# Patient Record
Sex: FEMALE | Race: WHITE | NOT HISPANIC OR LATINO | URBAN - METROPOLITAN AREA
[De-identification: names, ages, dates, MRNs, and addresses within clinical notes are randomized per-mention and may not be internally consistent; named-entity substitution may affect disease eponyms.]

---

## 2020-11-24 ENCOUNTER — VIRTUAL VISIT (OUTPATIENT)
Dept: FAMILY MEDICINE | Facility: CLINIC | Age: 25
End: 2020-11-24
Payer: COMMERCIAL

## 2020-11-24 DIAGNOSIS — F64.9 GENDER DYSPHORIA: Primary | ICD-10-CM

## 2020-11-24 PROBLEM — D50.0 IRON DEFICIENCY ANEMIA DUE TO CHRONIC BLOOD LOSS: Status: ACTIVE | Noted: 2019-08-21

## 2020-11-24 PROBLEM — K58.0 IRRITABLE BOWEL SYNDROME WITH DIARRHEA: Status: ACTIVE | Noted: 2019-08-21

## 2020-11-24 PROBLEM — L40.9 PSORIASIS: Status: ACTIVE | Noted: 2019-08-21

## 2020-11-24 PROCEDURE — 99203 OFFICE O/P NEW LOW 30 MIN: CPT | Mod: 95 | Performed by: FAMILY MEDICINE

## 2020-11-24 RX ORDER — LANSOPRAZOLE 30 MG/1
CAPSULE, DELAYED RELEASE ORAL
COMMUNITY
Start: 2020-08-24

## 2020-11-24 RX ORDER — TRIAMCINOLONE ACETONIDE 1 MG/G
CREAM TOPICAL
COMMUNITY

## 2020-11-24 RX ORDER — DROSPIRENONE AND ETHINYL ESTRADIOL 0.02-3(28)
KIT ORAL
COMMUNITY
Start: 2020-09-28

## 2020-11-24 RX ORDER — TACROLIMUS 1 MG/G
OINTMENT TOPICAL
COMMUNITY

## 2020-11-24 RX ORDER — CLOBETASOL PROPIONATE 0.5 MG/ML
SOLUTION TOPICAL
COMMUNITY

## 2020-11-24 SDOH — HEALTH STABILITY: MENTAL HEALTH: HOW OFTEN DO YOU HAVE A DRINK CONTAINING ALCOHOL?: NEVER

## 2020-11-24 ASSESSMENT — ANXIETY QUESTIONNAIRES
6. BECOMING EASILY ANNOYED OR IRRITABLE: NOT AT ALL
1. FEELING NERVOUS, ANXIOUS, OR ON EDGE: NOT AT ALL
GAD7 TOTAL SCORE: 0
7. FEELING AFRAID AS IF SOMETHING AWFUL MIGHT HAPPEN: NOT AT ALL
3. WORRYING TOO MUCH ABOUT DIFFERENT THINGS: NOT AT ALL
5. BEING SO RESTLESS THAT IT IS HARD TO SIT STILL: NOT AT ALL
IF YOU CHECKED OFF ANY PROBLEMS ON THIS QUESTIONNAIRE, HOW DIFFICULT HAVE THESE PROBLEMS MADE IT FOR YOU TO DO YOUR WORK, TAKE CARE OF THINGS AT HOME, OR GET ALONG WITH OTHER PEOPLE: NOT DIFFICULT AT ALL
2. NOT BEING ABLE TO STOP OR CONTROL WORRYING: NOT AT ALL

## 2020-11-24 ASSESSMENT — PATIENT HEALTH QUESTIONNAIRE - PHQ9
5. POOR APPETITE OR OVEREATING: NOT AT ALL
SUM OF ALL RESPONSES TO PHQ QUESTIONS 1-9: 0

## 2020-11-24 NOTE — PROGRESS NOTES
"Family Medicine Video Visit Note  Nathan is being evaluated via a billable video visit.           Video Visit Consent     Patient was verbally read the following and verbal consent was obtained.  \"This video visit will be conducted via a call between you and your physician/provider. We have found that certain health care needs can be provided without the need for an in-person physical exam.  This service lets us provide the care you need with a video conversation.  If a prescription is necessary we can send it directly to your pharmacy.  If lab work is needed we can place an order for that and you can then stop by our lab to have the test done at a later time.    If during the course of the call the physician/provider feels a video visit is not appropriate, you will not be charged for this service.\"     (Name person giving consent:  Patient   Date verbal consent given:  11/24/2020  Time verbal consent given:  9:26 AM)    Patient would like the video invitation sent by: Send to e-mail at: [email or web address]     Chief Complaint   Patient presents with     New Patient     GSC     Current Outpatient Medications   Medication Sig Dispense Refill     cholecalciferol 50 MCG (2000 UT) CAPS Take 4000 IU per day       clobetasol (TEMOVATE) 0.05 % external solution        drospirenone-ethinyl estradiol (MANGO) 3-0.02 MG tablet TAKE 1 TABLET BY MOUTH EVERY DAY FOR 3 WEEKS, TOSS 4TH WEEKS TABLETS, AND START A NEW PACK. TAKE 1 WEEK OFF EVERY 4 PACKS       LANsoprazole (PREVACID) 30 MG DR capsule TAKE 1 CAPSULE BY MOUTH DAILY       tacrolimus (PROTOPIC) 0.1 % external ointment        triamcinolone (KENALOG) 0.1 % external cream        Allergies   Allergen Reactions     Azithromycin      Lac Bovis Nausea and Vomiting and Nausea          HPI     Video Start Time: 9:55 AM    Gender Support Clinic Visit Note         HPI      Nathan is a 24 year old individual that uses any pronouns and is consulting for gender care.    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     Right now have a primary care provider. Looking more for provider for gender clinica and obgyn care.     Thinking about top surgery. Kind of scares them. They don't want to do anything that could affect their health. Was maybe thinking hormones but as a kid did take growth hormone so doesn't know if that would be a problem. 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 Doesn't mind voice. Pretty short. Would want to wait on hormones until knows for sure want to do them.     Gender identity  Gender Identity: Nonbinary-transmasculine.     Sex Assigned at Birth  female     Gender journey: Went to [employer] and took class freshman year about gender identity and sexual orientation. What each one means. Started it then. Came from family where didn't have that exposure. Didn't start until a couple years after college. Sergio year masculine clothing. Mainly wore sports bra. Senior year in college wanted to change but wasn't there yet. Feb this year wanted to change name and came out as nonbinary. Started this Feb. For them, doesn't have bottom dysphoria at all. Wears baggy clothing. Likes to be very neutral. Prefer to be more masculine. Didn't bind at that point. Binding scares them. Doesn't want to do anything harmful to self. Wants to be as safe as possible. Told immediate family in July. Feb to July mainly friends. Takes birth control and doesn't get period often (every 3 months). Tries to stick with masculine clothing. Bought safe binder from [facility]. Hesitant on stuff.     Support network for gender identity: Friends, parents are super supportive, family tries but hard because over zoom    Anatomic gender dysphoria:   - chest dysphoria, mainly chest right now  - no voice dysphoria  - doesn't want facial fair or would need facial hair  - no dysphoria around menses  - no bottom dysphoria, at least for now      Previous medical care  related to gender affirmation:   Surgical interventions desired: top surgery      Mental Health Assessment:  PHQ-9 SCORE 11/24/2020   PHQ-9 Total Score 0      KATHY-7 SCORE 11/24/2020   Total Score 0      Middle school got really bad anxiety and was taking medication for it. Got depression from it and so stopped it. Middle school severely bullied. Did relaxation techniques. More on the side of things affect them more. Still get some anxiety. Moments of severe anxiety. Relaxation techniques.    Hx of self harm:  No   Hx of suicidal thoughts: No   Hx of suicide attempts/ hospitalizations for mental health: No      Family history  -Mom with stroke at age 2004. No deficits. HLD. HTN  -Sister HLD  -Mat gpa: vascular dementia  -Mom's side cousins with breast cancer  -Mat gma glioblastoma  -Pat gpa dementia, age 90 stent in heart      Past History   Was on growth hormone as a kid. Having growing issues in elementary school. Much lower than graphs in weight and height. In middle school good candidate for growth hormone. Was age 11-12 when started it. Did that for 2-3 years. Then stopped because reached more normal height and no longer concerned with growth plates.    Always have had GI issues as a kid. Started taking prevacid for GERD in High School. More GI issues in college. Did GI workup (colonoscopy and MRI) and didn't find anything and so chalked it up to IBS.     Had psoriasis of scalp and groin area.     Past Medical History:   Diagnosis Date     Anemia      Gastroesophageal reflux disease      Growth hormone deficiency (H)     Required growth hormone at age 11-12.      Psoriasis      Current Outpatient Medications   Medication Sig Dispense Refill     cholecalciferol 50 MCG (2000 UT) CAPS Take 4000 IU per day       clobetasol (TEMOVATE) 0.05 % external solution        drospirenone-ethinyl estradiol (MANGO) 3-0.02 MG tablet TAKE 1 TABLET BY MOUTH EVERY DAY FOR 3 WEEKS, TOSS 4TH WEEKS TABLETS, AND START A NEW PACK. TAKE 1  WEEK OFF EVERY 4 PACKS       LANsoprazole (PREVACID) 30 MG DR capsule TAKE 1 CAPSULE BY MOUTH DAILY       tacrolimus (PROTOPIC) 0.1 % external ointment        triamcinolone (KENALOG) 0.1 % external cream        Family History   Problem Relation Age of Onset     Cerebrovascular Disease Mother      Hypertension Mother      Hyperlipidemia Mother      Allergies   Allergen Reactions     Azithromycin      Lac Bovis Nausea and Vomiting and Nausea     Social History     Socioeconomic History     Marital status: Single     Spouse name: None     Number of children: None     Years of education: None     Highest education level: None   Occupational History     None   Social Needs     Financial resource strain: None     Food insecurity     Worry: None     Inability: None     Transportation needs     Medical: None     Non-medical: None   Tobacco Use     Smoking status: Never Smoker     Smokeless tobacco: Never Used   Substance and Sexual Activity     Alcohol use: Not Currently     Frequency: Never     Drug use: Never     Sexual activity: None          Physical Exam:   There were no vitals filed for this visit.  BMI= There is no height or weight on file to calculate BMI.   Wt Readings from Last 10 Encounters:   No data found for Wt     GENERAL: healthy, alert and no distress  HEENT: sclera anicteric  RESP: normal respiratory effort  Psych: affect appropriate/mood-congruent, normal speech, insight good, judgement good  Neuro: no tremor  Skin: no jaundice; no rash on head or neck    Assessment and Plan     Nathan (uses any pronouns) was seen today for new patient to discuss gender care.    Gender dysphoria  List of gender therapists given to patient (via AVS) to help with gender identity exploration and goals going forward. Schedule follow up after several visits with therapist and we will continue discussing more medical specifics. Discussed safety with binder use (no more than 8 hours per day, if getting pain, take time off from  "gonzalo). Next visit discuss top surgery and what that would look like. Can also discuss hormone therapy and what that might look like and risks/benefits so patient can have the information to make a more informed decision about their gender care.     Follow up: after several visits with therapist or when ready      After Visit Information:  Patient chose to view AVS via [employer]    Video-Visit Details    Type of service:  Video Visit    Video End Time (time video stopped): 10:29am    Originating Location (pt. Location): Home    Distant Location (provider location):  [facility]     Mode of Communication:  Video Conference via Cytheris    I spent 34 min via video with the patient and >50% was spent counselling the patient about the above medical conditions, educating patient on their medical conditions, behavioral interventions and supports.    Conor \"Cinthya\"DO Oscar  Pager: [phone]      "

## 2020-11-24 NOTE — PATIENT INSTRUCTIONS
After visit summary    Schedule with gender therapist.     Schedule follow up at your convenience to discuss more medical specifics.     Cinthya Gabriel         Individual Mental Health Practitioners   Therapist Children Adolescents Adults Family Other   Kenney Fong PhD  [address] [place], [place]  [phone]  13+ YES     Mak [place], [facility], DCSW  [address]  [facility] [address]  Work Phone: [phone]   YES YES    Linn Gomes PsyD, LP  [address]  [place]  [address]  PH: [phone]   YES YES    Tiara Stern, [facility]  [address]  [place] [address]  [phone]  [facility]   YES YES On Bus line   Emily Garcia PsyD, [facility]  [facility]  [address]  [place], [place]   PH:[phone] YES YES YES YES Business consulting   Jose Boyer, PhD, LP  [address]  [place] [address]  PH: [phone]  [employer]  Older Adolescents YES  On busline   Phillip Heredia, [facility]  [address]  [place] [address]  PH: [phone]  [employer]  YES YES YES Busline   Lori Paez M.Ed, [facility], [facility]  [address]  [place], [place]   [phone]  [email or web address]   YES YES  Experienced in Trans Veterans    Allison Ayala MA, LMFT, CST  [address]  [place], [place] [address] [phone]  [facility].Askuity  YES YES YES Sex positive therapy    KAROLYN Jean MFA, PsyD, LP   [address]  [address]  [place] [address] [phone]  [email or web address]   YES YES Sexuality groups  [facility]        Mental Health [facility]   Patients: Children, Teen, Adult, Families, Couples  [address]  [place] [address] [phone] fax  [facility].Askuity    [facility]  Patients: Children, adolescents, adults  Locations:  [address]  [place] [address]  [place] [address]  [address]  [place] [address]  [place] [address]    Appointment Scheduling   [phone]  [email or web address] [email or web address]  [facility]  All ages- Also offers in-home therapy and sliding fee  [address]  [place] [address]   Phone: [phone]  boolinoUnityPoint Health-Trinity Regional Medical CenterPattern Genomics    [facility]-[address] [place] [address]  Intake line [phone]  [email or web address]://www.theRutland [facility] State HospitallypartMer[facility].org/programsservices/counseling/transgender-mental-health/    Transgender Mental Health Team  Therapists Children Adolescents Adults Family Other information   Donita Zavaleta MA, LMFT YES YES YES  [facility] Location   Chelsi Clemens MSW, LICSW   YES YES EMDR  [facility] Location     Umang Gutierrez MA, LP YES YES YES YES [facility] Location- [facility] Sexual medicine-Psychology  [email or web address]  [place] Location   [address] [place] [address]  Phone [phone]  Therapists Children Adolescents Adults Family Other   Kay Delacruz PsyD, LP   YES YES-couples    Robin [place] PsyD, LP   YES     Tia Rios, PhD, LP   YES       [place] Location  [address] [place] [place] [address]  Phone: [phone]  Therapists Children Adolescents Adults Family Other   Kim Page PsyD, LP   YES YES    Crystal Saenz PsyD, LP   YES       [facility]  Patients-Queer and Trans Youth and Family counseling serving ages 12-26  [address] [place] [address]  Phone: [phone]  http://Reclaim.[facility]  [employer]  [address]  [place] [address]  Main line: [phone]  [email or web address]    [facility] Center for Sexual Health/ Program in Human Sexuality  [address]  [place] [address]  PH: [phone]    [email or web address]://www.sexualhealth.[facility].edu/clinic-center-sexual-health/transgender-health-services    Therapists Children Adolescents Adults Family Other information   Dulce Avery PsyD YES YES YES     Mariana Perez, PhD YES YES YES     Valerie Caldwell PsyD  YES YES     Alicia Sampson, PhD YES YES YES     Medardo Garcia, PhD, MPH YES YES   Sexual and Gender minority health   Multiple other Post-Doctoral fellows practicing at this location as well           [facility]  [place] Location: [address] [place] [address]  [place] Location: [address] [address]  Toll Free: [phone]  Phone: [phone]   http://www.[facility]/therapeutic-services    Daytime LGBTQ+ DBT skills group  Groups are being added at [facility], in addition to the current evening LGBTQ+ skills group. Both skills groups are held on Mondays.  Daytime group will be 1:00pm-3:30pm and evening group (currently full) is 5:00pm-7:30pm.  [facility] offers a certified DBT program. Clients commit to one year of participation in skills group and individual DBT therapy. DBT individual therapy can be done by current therapist if the therapist is intensively trained and part of consultation group of intensively trained therapists, and if the therapist offers 24/7 coaching calls. Otherwise, the client can work with one of our DBT therapists and then return to their previous individual therapist at the conclusion of the 1-year program.  MWP Member Ruthie Beltre MA, [facility]   [phone]  [email or web address].com  [address] [facility] [address], [place]     Chemical Health Resources    [facility]  [address] [place] [address]  Phone: [phone] or toll free, [phone]  [email or web address]    [facility] LGBT Residential CD Treatment Program  [address] [place]  [address]  [phone]  http://www.Aibo.PriceTag/programs/residential/ckxacaeiq-dujp-vnxoiytytfi-[facility]-mn/    Additional Provider Directories  [facility]- Provider Directory for all services  http://www.[facility].org/resources-for-you/provider-directory/    [facility] lesbian mackey bisexual transgender & allied mental health providers' network  http://www.lgbttherapists.org/    Provider Directory for Health Related resources in [place]  http://mnlgbtqdirectory.org    Directory for kink, Polyamory, gender non-conforming aware Providers  http://www.kinkaware.org  \

## 2020-11-25 ASSESSMENT — ANXIETY QUESTIONNAIRES: GAD7 TOTAL SCORE: 0

## 2021-01-15 ENCOUNTER — HEALTH MAINTENANCE LETTER (OUTPATIENT)
Age: 26
End: 2021-01-15

## 2021-05-25 ENCOUNTER — OFFICE VISIT (OUTPATIENT)
Dept: FAMILY MEDICINE | Facility: CLINIC | Age: 26
End: 2021-05-25
Payer: COMMERCIAL

## 2021-05-25 VITALS
TEMPERATURE: 98.2 F | RESPIRATION RATE: 16 BRPM | SYSTOLIC BLOOD PRESSURE: 118 MMHG | HEART RATE: 84 BPM | DIASTOLIC BLOOD PRESSURE: 76 MMHG | WEIGHT: 137 LBS | OXYGEN SATURATION: 96 %

## 2021-05-25 DIAGNOSIS — F64.9 GENDER DYSPHORIA: Primary | ICD-10-CM

## 2021-05-25 DIAGNOSIS — J30.2 SEASONAL ALLERGIC RHINITIS, UNSPECIFIED TRIGGER: ICD-10-CM

## 2021-05-25 DIAGNOSIS — Z83.438 FAMILY HISTORY OF HYPERLIPIDEMIA: ICD-10-CM

## 2021-05-25 PROCEDURE — 99213 OFFICE O/P EST LOW 20 MIN: CPT | Mod: GC | Performed by: STUDENT IN AN ORGANIZED HEALTH CARE EDUCATION/TRAINING PROGRAM

## 2021-05-25 RX ORDER — CETIRIZINE HYDROCHLORIDE 10 MG/1
10 TABLET ORAL DAILY
COMMUNITY
Start: 2021-05-25

## 2021-05-25 NOTE — PATIENT INSTRUCTIONS
Wonderful to meet you today, Nathan.    Today we discussed:    1. Gender care  - Can continue binding, if you are binding more frequently/more days in a row, try to limit the number of hours you are binding to less than 8. Otherwise, you are currently binding an amount that is pretty conservative and shouldn't cause problems.   - I will check in with a [employer] message in about 3 months to see how you are doing and if you have any updates/needs from a gender care perspective.  - COMPREHENSIVE GENDER CARE REFERRAL - INTERNAL    2. Seasonal allergic rhinitis, unspecified trigger  - cetirizine (ZYRTEC) 10 MG tablet; Take 1 tablet (10 mg) by mouth daily  Dispense:      3. Family history of hyperlipidemia  If you eventually decide to pursue testosterone therapy we will closely monitor your cholesterol since you have a strong family history of high cholesterol.       Please call or return to clinic if your symptoms don't go away.    Follow up plan  Return in about 6 months (around 11/25/2021) for gender care follow-up.    Thank you for coming to [facility] today.  COVID-19 Vaccine:  If you are eligible for the COVID-19 vaccine, you can schedule via [employer] or call [place] Scheduling at [phone]. If you need assistance with scheduling, please speak to a Care Coordinator or your provider.   Lab Testing:  **If you had lab testing today and your results are reassuring or normal they will be mailed to you or sent through [employer] within 7 days.   **If the lab tests need quick action we will call you with the results.  **If you are having labs done on a different day, please call [phone] to schedule at [facility] or [phone] for other [place] [facility] locations.   The phone number we will call with results is # [phone] (home) . If this is not the best number please call our clinic and change the number.  Medication Refills:  If you need any refills please call your pharmacy and they will contact  us.   If you need to  your refill at a new pharmacy, please contact the new pharmacy directly. The new pharmacy will help you get your medications transferred faster.   Scheduling:  If you have any concerns about today's visit or wish to schedule another appointment please call our office during normal business hours [phone] (8-5:00 M-F)  If a referral was made to a [facility] and you don't get a call from central scheduling please call [phone].  If a Mammogram was ordered for you at [facility] call [phone] to schedule or change your appointment.  If you had an EKG/XRay/CT/Ultrasound/MRI ordered the number is [phone] to schedule or change your radiology appointment.   Medical Concerns:  If you have urgent medical concerns please call [phone] at any time of the day.    Lupe Bray MD

## 2021-05-25 NOTE — PROGRESS NOTES
Assessment & Plan     Gender dysphoria  Family history of hyperlipidemia  Patient new to me, second visit in clinic after initial gender care intake in 11/2020 with Dr. Moore. They were AFAB and use they/them pronouns and are interested in masculinizing gender care. Major area of dysphoria is chest; they bind regularly, but not excessively. Ultimately interested in top surgery, for which comprehensive gender care referral was placed today. They are not currently interested in hormonal therapy, but we will continue to discuss over time. If they ultimately decide to pursue hormonal masculinizing therapy, would be follow lipids closely as they have strong family history of hyperlipidemia.   - COMPREHENSIVE GENDER CARE REFERRAL - INTERNAL    Seasonal allergic rhinitis, unspecified trigger  - cetirizine (ZYRTEC) 10 MG tablet; Take 1 tablet (10 mg) by mouth daily        Return in about 6 months (around 11/25/2021) for gender care follow-up.    Lupe Bray MD  [facility] LUZ ELENA Evans is a 25 year old who presents for the following health issues     HPI     Chief Complaint   Patient presents with     RECHECK     Follow up apt from therapy apt. 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 Almost never has to shave. Pubic hairNo issues with hair on their head.     Therapy:  Has been seeing therapist  Does not feel ready for testosterone yet. Does not want all of the things that would come with masculinizing therapy (specifically facial hair growth and voice lowering)  But does feel a lot of dysphoria with with chest  Binds 1-2x/week (occasionally more frequently), limits at 8-10 hours. Takes breaks.     Ultimate goals:   - Top surgery  - Possible masculinizing hormone therapy at some point, is worried that they will get the changes they don't want and less  of what they do want.     History of hyperlipidemia:  - Strong family history of hyperlipidemia      Review of Systems   Negative except as noted above      Objective    /76   Pulse 84   Temp 98.2  F (36.8  C) (Oral)   Resp 16   Wt 62.1 kg (137 lb)   LMP 04/14/2021 (Approximate)   SpO2 96%   There is no height or weight on file to calculate BMI.  Physical Exam   GEN: Alert, oriented person in NAD  HENT: normocephalic, atraumatic  NECK: full ROM  RESPIRATORY: no respiratory distress, no extra WOB, speaking in full sentences  CVS: regular rate, WWP  MSK: full ROM, no obvious peripheral edema. Normal gait.  NEURO: no FND.

## 2021-06-15 ENCOUNTER — TELEPHONE (OUTPATIENT)
Dept: PLASTIC SURGERY | Facility: CLINIC | Age: 26
End: 2021-06-15

## 2021-06-17 NOTE — TELEPHONE ENCOUNTER
[facility] :  Care Coordination Note     SITUATION   Pt (Nathan, they/them) is a 25 year old adult who is receiving support for:  Care Team  .    BACKGROUND     Writer called pt regarding interest in top surgery. Scheduled pt for 3/15/22 with Rosalia. Sent pt options for periareolar and buttonhole surgeries.     ASSESSMENT     Surgery              CGC Assessment  Comprehensive Gender Care ([facility]) Enrollment: Enrolled  Patient has a therapist: Yes  Name of therapist: KAROLYN Jean  Letter of support #1: Requested  Surgery being considered: Yes  Mastectomy: Yes    Pt reports:    No smoking  No diabetes  No HRT  No previous gender confirming surgeries  Sees Ari Jean for therapy      PLAN          Nursing Interventions:  [facility] assessment completed    Follow-up plan:    1. Obtain MARLENA Pina

## 2021-09-12 ENCOUNTER — HEALTH MAINTENANCE LETTER (OUTPATIENT)
Age: 26
End: 2021-09-12

## 2021-12-16 NOTE — TELEPHONE ENCOUNTER
FUTURE VISIT INFORMATION      FUTURE VISIT INFORMATION:    Date: 3/15/22    Time: 3:00pm    Location: [facility]  REFERRAL INFORMATION:    Referring provider:  self    Referring providers clinic:  n/a    Reason for visit/diagnosis  top consult    RECORDS REQUESTED FROM:       No recs to collect per pt

## 2022-01-26 ENCOUNTER — OFFICE VISIT (OUTPATIENT)
Dept: FAMILY MEDICINE | Facility: CLINIC | Age: 27
End: 2022-01-26
Payer: COMMERCIAL

## 2022-01-26 VITALS
DIASTOLIC BLOOD PRESSURE: 67 MMHG | TEMPERATURE: 97.8 F | HEART RATE: 82 BPM | OXYGEN SATURATION: 99 % | RESPIRATION RATE: 16 BRPM | SYSTOLIC BLOOD PRESSURE: 107 MMHG | WEIGHT: 133 LBS

## 2022-01-26 DIAGNOSIS — F64.9 GENDER DYSPHORIA: Primary | ICD-10-CM

## 2022-01-26 PROCEDURE — 99213 OFFICE O/P EST LOW 20 MIN: CPT | Performed by: STUDENT IN AN ORGANIZED HEALTH CARE EDUCATION/TRAINING PROGRAM

## 2022-01-26 RX ORDER — FLUOCINONIDE TOPICAL SOLUTION USP, 0.05% 0.5 MG/ML
SOLUTION TOPICAL
COMMUNITY
Start: 2022-01-20

## 2022-01-26 NOTE — PATIENT INSTRUCTIONS
Patient Education   Here is the plan from today's visit    1. Gender dysphoria  Maude Evans it was great to meet you today! Below are a few of our general recommendations for our trans patients regarding resources. Some are more targeted to youth but may still be worth checking out. Herminia CASTANEDA and [employer] [employer] [place] also are good resources. They have online meetings but this is a good way to meet more members of the local community and also a way to learn about other resources that exist up here. Follow up with our clinic as needed for your gender support and as always please reach out if you have any questions/concerns or find other ways we can be a support system for you!       Some online resources for transgender health    [place] [facility] [facility]   Home to [facility] at [address] [place] [address], [phone]. Also has support groups.  Http://www.[place]transhealth.org/   Wednesdays: Support Group 6-7:30pm for all gender variant people    [facility] for Transgender Health  Increasing access to comprehensive, effective, and affirming healthcare services for trans and gender-variant communities.  http://www.transhealth.[facility].edu/trans?page=michelle-00-05    [facility]   Community-based non-profit committed to advancing the health & wellness of LGBTQ communities through research, education and advocacy. http://www.[employer].org    Safe, gender-neutral public restrooms in the [facility]   http://www.[place]transhealth.org//index.php?option=com_content&task=view&id=12&Itemid    Trans Youth Support Network and [employer]  For people 26 and under who identify as a trans or gender non-conforming person and want to be a part of an activist organization. Offers peer support, education and community building opportunities. Find them on Facebook!    Reclaim  RECLAIM offers mental and integrative health services for LGBTQ youth and their families.   http://www.reclaim-lgbtyouth.org/    Gender Spectrum, for Trans Youth  Gender Spectrum provides education, training and support to help create a gender sensitive and inclusive environment for all children and teens. http://www.genderspectrum.org/    [facility] FTM Resource Guide  Information on topics of interest to female-to-male (FTM, F2M) trans men, and their friends and loved ones.  http://ftDealentrauide.org/    Also check out your local [employer] resource center!  LGBTQIA Services at [facility]: http://www.[facility]/lgbtqia/  LGBTQ@[facility] at [facility]: http://www.[facility]/multiculturallife/lgbtq/  GLBTA Programs office at [facility]: https://diversity.[facility]/glbta/    Thoughts of self harm?   Trans Lifeline can be reached at [phone]. This service is staffed by trans people 24/7.   For LGBT youth (ages 24 and younger) contemplating suicide, the [employer] Lifeline can be reached at [phone].      Please call or return to clinic if your symptoms don't go away.    Follow up plan  No follow-ups on file.    Thank you for coming to [facility] today.  Lab Testing:  **If you had lab testing today and your results are reassuring or normal they will be mailed to you or sent through [employer] within 7 days.   **If the lab tests need quick action we will call you with the results.  **If you are having labs done on a different day, please call [phone] to schedule at [facility] or [phone] for other [facility] locations. Labs do not offer walk-in appointments.  The phone number we will call with results is # [phone] (home) . If this is not the best number please call our clinic and change the number.  Medication Refills:  If you need any refills please call your pharmacy and they will contact us.   If you need to  your refill at a new pharmacy, please contact the new pharmacy directly. The new pharmacy will help you get your medications transferred faster.    Scheduling:  If you have any concerns about today's visit or wish to schedule another appointment please call our office during normal business hours [phone] (8-5:00 M-F)  If a referral was [facility] to an [facility] provider and you do not get a call from central scheduling, please refer to directions on your visit summary or call our office during normal business hours for assistance.   If a Mammogram was ordered for you at the [facility] call [phone] to schedule or change your appointment.  If you had an XRay/CT/Ultrasound/MRI ordered the number is [phone] to schedule or change your radiology appointment.   [facility] has limited ultrasound appointments available on Wednesdays, if you would like your ultrasound at [facility], please call [phone] to schedule.   Medical Concerns:  If you have urgent medical concerns please call [phone] at any time of the day.    Lauryn Ibrahim MD

## 2022-01-26 NOTE — PROGRESS NOTES
Assessment & Plan     Gender dysphoria  26 year old nonbinary/transmasculine person who uses they/them pronouns here today to follow up on gender care. Last saw Dr. Bray in May 2021. Is getting primary care through  physician with [facility]. Currently scheduled for top surgery in March with Parveen Glover through [facility]. Feels they they don't have a lot of connection with resources in this community and would like more groups to connect with. Discussed how testosterone would like improve anemia as a side effect is increasing hgb levels and uncertain what if any effect it would have on their psoriasis.     Resources provided in AVS including online support group [employer] or [place] added to AVS.      Follow up as needed for gender care    No follow-ups on file.    Lauryn Ibrahim MD  [facility] LUZ ELENA Evans is a 26 year old who presents for the following health issues:    HPI   In today to [facility] since not seen in clinic since summer. Is scheduled for top surgery in March with [facility] (Parveen Glover MD). Very excited about this. Chest is main area of dysphoria. Not currently on HRT not not sure if they are planning on this in the future but wondering about getting T after top surgery and if this would affect the tissue at all. Also curious about their personal hx of severe psoriasis and anemia and how T could impact these.      Hx of breast cancer on Mom's side, in 2-3 people but all at least second degree relatives and no hx in cis-male relatives. Discussed future screening plans.       Review of Systems   ROS otherwise negative if not stated in HPI        Objective    /67   Pulse 82   Temp 97.8  F (36.6  C) (Oral)   Resp 16   Wt 60.3 kg (133 lb)   SpO2 99%   There is no height or weight on file to calculate BMI.  Physical Exam   GENERAL: healthy, alert and no distress  RESP: breathing comfortably on room air  CV: regular heart rate and no  cyanosis noted   MS: no gross musculoskeletal defects noted, no edema  SKIN: no suspicious lesions or rashes  NEURO: mentation intact and speech normal  PSYCH: mentation appears normal, affect normal/bright    No results found for this or any previous visit (from the past 24 hour(s)).

## 2022-02-27 ENCOUNTER — HEALTH MAINTENANCE LETTER (OUTPATIENT)
Age: 27
End: 2022-02-27

## 2022-03-15 ENCOUNTER — PRE VISIT (OUTPATIENT)
Dept: SURGERY | Facility: CLINIC | Age: 27
End: 2022-03-15

## 2022-11-19 ENCOUNTER — HEALTH MAINTENANCE LETTER (OUTPATIENT)
Age: 27
End: 2022-11-19

## 2024-11-02 ENCOUNTER — HEALTH MAINTENANCE LETTER (OUTPATIENT)
Age: 29
End: 2024-11-02